# Patient Record
Sex: FEMALE | Race: ASIAN | Employment: FULL TIME | URBAN - METROPOLITAN AREA
[De-identification: names, ages, dates, MRNs, and addresses within clinical notes are randomized per-mention and may not be internally consistent; named-entity substitution may affect disease eponyms.]

---

## 2024-04-04 ENCOUNTER — HOSPITAL ENCOUNTER (OUTPATIENT)
Age: 29
Discharge: HOME | End: 2024-04-04
Payer: COMMERCIAL

## 2024-04-04 DIAGNOSIS — M79.605 PAIN OF LEFT LOWER EXTREMITY: ICD-10-CM

## 2024-04-04 LAB — D DIMER: <200 NG/ML DDU (ref 0–232)

## 2024-04-04 PROCEDURE — 36415 COLL VENOUS BLD VENIPUNCTURE: CPT

## 2024-04-04 PROCEDURE — 85379 FIBRIN DEGRADATION QUANT: CPT

## 2025-07-22 ENCOUNTER — OFFICE VISIT (OUTPATIENT)
Dept: URGENT CARE | Age: 30
End: 2025-07-22
Payer: COMMERCIAL

## 2025-07-22 VITALS
DIASTOLIC BLOOD PRESSURE: 85 MMHG | TEMPERATURE: 97.4 F | HEART RATE: 82 BPM | SYSTOLIC BLOOD PRESSURE: 130 MMHG | RESPIRATION RATE: 16 BRPM | OXYGEN SATURATION: 99 %

## 2025-07-22 DIAGNOSIS — S61.011A LACERATION OF RIGHT THUMB WITHOUT FOREIGN BODY WITHOUT DAMAGE TO NAIL, INITIAL ENCOUNTER: Primary | ICD-10-CM

## 2025-07-22 PROCEDURE — 1036F TOBACCO NON-USER: CPT | Performed by: NURSE PRACTITIONER

## 2025-07-22 PROCEDURE — 99203 OFFICE O/P NEW LOW 30 MIN: CPT | Performed by: NURSE PRACTITIONER

## 2025-07-22 RX ORDER — DESOGESTREL AND ETHINYL ESTRADIOL 21-5 (28)
KIT ORAL
COMMUNITY
Start: 2025-07-17

## 2025-07-22 ASSESSMENT — ENCOUNTER SYMPTOMS: WOUND: 1

## 2025-07-22 ASSESSMENT — PAIN SCALES - GENERAL: PAINLEVEL_OUTOF10: 10-WORST PAIN EVER

## 2025-07-22 NOTE — PROGRESS NOTES
Subjective   Patient ID: Vonnie DIAZ is a 29 y.o. female. They present today with a chief complaint of Finger Laceration (Left thumb laceration X 30 minutes ago on cheese grater at home. ).    History of Present Illness  HPI    Patient presents to urgent care for complaints of right thumb laceration. Patient states she cut her thumb on a cheese grater 30 minutes prior to arrival. Reports tetanus is up to date.     Past Medical History  Allergies as of 07/22/2025    (No Known Allergies)       Prescriptions Prior to Admission[1]     Medical History[2]    Surgical History[3]     reports that she has never smoked. She has never used smokeless tobacco. Alcohol use questions deferred to the physician. She reports that she does not use drugs.    Review of Systems  Review of Systems   Skin:  Positive for wound.                                  Objective    Vitals:    07/22/25 1634   BP: 130/85   Pulse: 82   Resp: 16   Temp: 36.3 °C (97.4 °F)   SpO2: 99%     Patient's last menstrual period was 07/17/2025 (exact date).    Physical Exam  Vitals reviewed.   Constitutional:       Appearance: Normal appearance.     Musculoskeletal:      Right hand: No deformity. Normal range of motion (Negative decreased ROM of right thumb. tendon intact). Normal sensation. Normal capillary refill. Normal pulse.     Skin:     General: Skin is warm.      Findings: Laceration (small avulsion to right thumb at MCP joint. When patient bends her thumb, bleeding starts) present.     Neurological:      Mental Status: She is alert.         Wound Care    Date/Time: 7/22/2025 5:00 PM    Performed by: FLAKITA Diane  Authorized by: FLAKITA Diane    Consent:     Consent obtained:  Verbal    Consent given by:  Patient    Risks discussed:  Bleeding, infection and pain  Sedation:     Sedation type:  None  Anesthesia:     Anesthesia method:  None  Procedure details:     Wound location:  Finger    Finger location:  R  thumb  Dressing:     Dressing applied:  Gelfoam small (non adhederent dressing applied)    Wrapped with:  Conform bandage 2 inch  Post-procedure details:     Procedure completion:  Tolerated well, no immediate complications      Point of Care Test & Imaging Results from this visit  No results found for this visit on 07/22/25.   Imaging  No results found.    Cardiology, Vascular, and Other Imaging  No other imaging results found for the past 2 days      Diagnostic study results (if any) were reviewed by FLAKITA Diane.    Assessment/Plan   Allergies, medications, history, and pertinent labs/EKGs/Imaging reviewed by FLAKITA Diane.     Medical Decision Making  Follow-up plied to help stop the bleeding.  Wound was wrapped and patient was given a finger splint to keep it from moving.  Tetanus is up-to-date.  Follow-up if needed.    At time of discharge patient was clinically well-appearing and HDS for outpatient management. The patient and/or family was educated regarding diagnosis, supportive care, OTC and Rx medications. The patient and/or family was given the opportunity to ask questions prior to discharge.  They verbalized understanding of my discussion of the plans for treatment, expected course, indications to return to  or seek further evaluation in ED, and the need for timely follow up as directed.   They were provided with a work/school excuse if requested.      Orders and Diagnoses  Diagnoses and all orders for this visit:  Laceration of left thumb without foreign body without damage to nail, initial encounter      Medical Admin Record      Patient disposition: Home    Electronically signed by FLAKITA Diane  4:45 PM           [1] (Not in a hospital admission)  [2] No past medical history on file.  [3] No past surgical history on file.